# Patient Record
Sex: MALE | Race: WHITE | NOT HISPANIC OR LATINO | URBAN - METROPOLITAN AREA
[De-identification: names, ages, dates, MRNs, and addresses within clinical notes are randomized per-mention and may not be internally consistent; named-entity substitution may affect disease eponyms.]

---

## 2020-09-10 ENCOUNTER — OUT OF OFFICE VISIT (OUTPATIENT)
Dept: URBAN - METROPOLITAN AREA MEDICAL CENTER 19 | Facility: MEDICAL CENTER | Age: 85
End: 2020-09-10
Payer: MEDICARE

## 2020-09-10 DIAGNOSIS — R13.19 CERVICAL DYSPHAGIA: ICD-10-CM

## 2020-09-10 DIAGNOSIS — K22.5 DIVERTICULITIS, ESOPHAGUS: ICD-10-CM

## 2020-09-10 PROCEDURE — 99221 1ST HOSP IP/OBS SF/LOW 40: CPT | Performed by: INTERNAL MEDICINE

## 2020-11-19 ENCOUNTER — OFFICE VISIT (OUTPATIENT)
Dept: URBAN - METROPOLITAN AREA CLINIC 113 | Facility: CLINIC | Age: 85
End: 2020-11-19

## 2020-11-19 ENCOUNTER — DASHBOARD ENCOUNTERS (OUTPATIENT)
Age: 85
End: 2020-11-19

## 2020-11-19 NOTE — HPI-TODAY'S VISIT:
89-year-old male with a history of type 2 diabetes, hypertension, hyperlipidemia, presenting for follow-up after hospitalization after being found down at his residence. He was seen in consultation by Dr. Woodward for dysphagia related to a Zenker's diverticulum.  He was admitted to the hospital on 9/3/2020.  Throughout the admission he struggled with dysphagia and difficulty with oral intake.  He underwent fluoroscopy with swallow study and was noted to have aspiration with thin as well as nectar thick liquids.  He had an esophageal outpouching at the level of C4 likely reflecting a Zenker diverticulum.  He was recommended an ENT evaluation for Zenker diverticulum as well as PPI therapy due to recurrent aspiration events.  He was to avoid NSAIDs.